# Patient Record
Sex: MALE | Race: BLACK OR AFRICAN AMERICAN | NOT HISPANIC OR LATINO | URBAN - METROPOLITAN AREA
[De-identification: names, ages, dates, MRNs, and addresses within clinical notes are randomized per-mention and may not be internally consistent; named-entity substitution may affect disease eponyms.]

---

## 2022-12-09 ENCOUNTER — OFFICE VISIT (OUTPATIENT)
Dept: URBAN - METROPOLITAN AREA CLINIC 26 | Facility: CLINIC | Age: 82
End: 2022-12-09
Payer: MEDICARE

## 2022-12-09 ENCOUNTER — LAB OUTSIDE AN ENCOUNTER (OUTPATIENT)
Dept: URBAN - METROPOLITAN AREA CLINIC 27 | Facility: CLINIC | Age: 82
End: 2022-12-09

## 2022-12-09 ENCOUNTER — OFFICE VISIT (OUTPATIENT)
Dept: URBAN - METROPOLITAN AREA CLINIC 27 | Facility: CLINIC | Age: 82
End: 2022-12-09
Payer: MEDICARE

## 2022-12-09 ENCOUNTER — WEB ENCOUNTER (OUTPATIENT)
Dept: URBAN - METROPOLITAN AREA CLINIC 27 | Facility: CLINIC | Age: 82
End: 2022-12-09

## 2022-12-09 VITALS
HEART RATE: 70 BPM | BODY MASS INDEX: 24.4 KG/M2 | WEIGHT: 151.8 LBS | SYSTOLIC BLOOD PRESSURE: 127 MMHG | DIASTOLIC BLOOD PRESSURE: 88 MMHG | HEIGHT: 66 IN

## 2022-12-09 DIAGNOSIS — Z79.82 LONG TERM (CURRENT) USE OF ASPIRIN: ICD-10-CM

## 2022-12-09 DIAGNOSIS — Z12.11 SCREEN FOR COLON CANCER: ICD-10-CM

## 2022-12-09 DIAGNOSIS — R10.13 EPIGASTRIC ABDOMINAL PAIN: ICD-10-CM

## 2022-12-09 DIAGNOSIS — I10 HYPERTENSION: ICD-10-CM

## 2022-12-09 PROCEDURE — 83014 H PYLORI DRUG ADMIN: CPT | Performed by: INTERNAL MEDICINE

## 2022-12-09 PROCEDURE — 99244 OFF/OP CNSLTJ NEW/EST MOD 40: CPT | Performed by: INTERNAL MEDICINE

## 2022-12-09 PROCEDURE — 83013 H PYLORI (C-13) BREATH: CPT | Performed by: INTERNAL MEDICINE

## 2022-12-09 PROCEDURE — 99204 OFFICE O/P NEW MOD 45 MIN: CPT | Performed by: INTERNAL MEDICINE

## 2022-12-09 NOTE — PHYSICAL EXAM NEUROLOGIC:
oriented to person, place and time, short and long term memory intact, sensory exam intact; mild/moderate cognitive impairment noted

## 2022-12-09 NOTE — HPI-TODAY'S VISIT:
OFFICE VISIT      Patient: Murphy Cleaning Date of Service: 2021   : 1957 MRN: [id number]     SUBJECTIVE:     Chief Complaint   Patient presents with   • Cough     onset: 2 days. Burning sensation in chest with coughing. Using vicks vaporub and mucinex. Vaccinated for COVID 19   • Sore Throat     hoarse voice       HISTORY OF PRESENT ILLNESS:  Murphy Cleaning is a 64 year old male who presents today for sore throat, hoarse voice, cough, and a burning sensation in chest with coughing x 2 days.     He's been using Vicks vaporub and Mucinex - which helps.     He denies of fever, runny nose, cp, sob, abdominal pain, n/v/d, loss sense of taste/smell, HA, or dizziness.     Denies of known exposure to Covid-19. Denies of recent traveling. He works as a , drives all over [facility].   He's vaccinated for Covid-19.     This whole visit is completed using  services [facility] #[id number]    No LMP for male patient.    PAST MEDICAL HISTORY:  Past Medical History:   Diagnosis Date   • Type 2 diabetes mellitus with hyperglycemia (CMS/[facility]) 9/15/2020       MEDICATIONS:  Current Outpatient Medications   Medication Sig   • amoxicillin (AMOXIL) 500 MG tablet Take 1 tablet by mouth 2 times daily for 10 days.   • Victoza 18 MG/3ML pen-injector ADMINISTER 1.2 MG UNDER THE SKIN DAILY   • metformin (GLUCOPHAGE) 1000 MG tablet Take 1 tablet by mouth 2 times daily.   • atorvastatin (LIPITOR) 20 MG tablet Take 1 tablet by mouth daily.   • insulin degludec (TRESIBA FLEXTOUCH) 100 UNIT/ML pen-injector Inject 30 Units into the skin daily. Prime 2 units before each dose.   • Insulin Pen Needle (Pen Needles 3/16\") 31G X 5 MM Misc 1 each 2 times daily.   • aspirin (ASPIRIN EC ADULT LOW STRENGTH) 81 MG EC tablet      No current facility-administered medications for this visit.       ALLERGIES:  ALLERGIES:  No Known Allergies    PAST SURGICAL HISTORY:  History reviewed. No pertinent surgical history.    FAMILY HISTORY:  Family  Patient here at the request of Alejandra Mancuso NP for evaluation of epigastric abdominal pain that has been that has been present >6mos.  He is a suboptimal historian due to a mild/moderate cognitive deficit, and his daughter is here to help with the history.  He apparently has had intermittent epigastric pain which is sometimes worse with p.o. intake.  It is deep and it does not radiate.  Pepcid is unhelpful.  It occurs on a near daily basis and will often last most of the day.  He does not take any medication for this aside from Tylenol.  He has no other GI symptoms.  He has not had any rectal bleeding or unintentional weight loss.  He has not had any nausea or reflux.  He has not had a prior upper endoscopy but may have undergone a colonoscopy >10y ago (?results).  He is s/p CABG in 2010.  He does take a daily aspirin.  CT abdomen pelvis in May of last year was without acute pathology. He has not had any recent labs or other imaging studies.  There is no family history of colon cancer or polyps. History   Problem Relation Age of Onset   • Cancer Mother    • Heart disease Father        SOCIAL HISTORY:  Social History     Tobacco Use   • Smoking status: Former Smoker     Packs/day: 0.00   • Smokeless tobacco: Never Used   Substance Use Topics   • Alcohol use: Not Currently   • Drug use: Never       Review of Systems   Constitutional: Negative.    HENT: Positive for sore throat and voice change.    Respiratory: Positive for cough. Negative for chest tightness, shortness of breath and wheezing.         Burning sensation to chest with coughing   Cardiovascular: Negative.    Gastrointestinal: Negative.    Skin: Negative.    Psychiatric/Behavioral: Negative.    All other systems reviewed and are negative.        OBJECTIVE:     Vitals:    11/24/21 1147   BP: (!) 148/96   BP Location: RUE - Right upper extremity   Patient Position: Sitting   Cuff Size: Large Adult   Pulse: 88   Resp: 20   Temp: 97 °F (36.1 °C)   TempSrc: Tympanic   SpO2: 96%       Physical Exam  Vitals and nursing note reviewed.   Constitutional:       General: He is not in acute distress.     Appearance: Normal appearance. He is not ill-appearing, toxic-appearing or diaphoretic.   HENT:      Head: Normocephalic and atraumatic.      Right Ear: Tympanic membrane, ear canal and external ear normal.      Left Ear: Tympanic membrane, ear canal and external ear normal.      Nose: Nose normal.      Right Sinus: No maxillary sinus tenderness or frontal sinus tenderness.      Left Sinus: No maxillary sinus tenderness or frontal sinus tenderness.      Mouth/Throat:      Lips: Pink.      Mouth: Mucous membranes are moist.      Pharynx: Posterior oropharyngeal erythema and uvula swelling present. No pharyngeal swelling or oropharyngeal exudate.      Tonsils: No tonsillar exudate or tonsillar abscesses. 1+ on the right. 1+ on the left.   Cardiovascular:      Rate and Rhythm: Normal rate and regular rhythm.      Heart sounds: Normal heart sounds.   Pulmonary:       Effort: Pulmonary effort is normal. No respiratory distress.      Breath sounds: Normal breath sounds. No stridor. No wheezing, rhonchi or rales.   Chest:      Chest wall: No tenderness.   Musculoskeletal:      Cervical back: Normal range of motion and neck supple. No rigidity or tenderness.   Lymphadenopathy:      Cervical: No cervical adenopathy.   Skin:     General: Skin is warm and dry.   Neurological:      General: No focal deficit present.      Mental Status: He is alert and oriented to person, place, and time. Mental status is at baseline.   Psychiatric:         Mood and Affect: Mood normal.         Behavior: Behavior normal.         Thought Content: Thought content normal.         Judgment: Judgment normal.         DIAGNOSTIC STUDIES:   LAB RESULTS:    Results for orders placed or performed in visit on 11/24/21   POCT RAPID STREP A   Result Value Ref Range    GRP A STREP Positive (A) Negative    Internal Procedural Controls Acceptable Yes              Assessment AND PLAN:     1. Strep pharyngitis    2. Sore throat    3. Cough    4. Elevated BP without diagnosis of hypertension      Orders Placed This Encounter   • 2019 Novel Coronavirus (SARS-CoV-2)   • POCT Rapid strep A   • COVID DIAGNOSTIC TESTING   • amoxicillin (AMOXIL) 500 MG tablet       -take medication as Rx'd  -eat yogurt or take probiotic to lessen GI upset  -drink at least 6-8oz glasses of water a day to stay hydrated    -avoid food that is greasy, crunchy, and spicy  -eat something soft and bland  -gargle salt water  -may take OTC throat lozenges, Chloraseptic spray, Tylenol, for pain  -switch out your tooth brush day #3 of your antibiotics  -do not share utensils; you are contagious until 24 hours after you started your antibiotics  -wash pillow cases in hot water  -follow up in office or with your primary doctor if no improvement or worsening of symptoms.      Elevated bp  Patient has no HTN diagnosis.   He's asymptomatic.   F/u with pcp for  elevated bp.       The patient indicated understanding of the diagnosis and agreed with the plan of care.        Gianna RINCON

## 2022-12-10 ENCOUNTER — LAB OUTSIDE AN ENCOUNTER (OUTPATIENT)
Dept: URBAN - METROPOLITAN AREA CLINIC 27 | Facility: CLINIC | Age: 82
End: 2022-12-10

## 2022-12-10 ENCOUNTER — TELEPHONE ENCOUNTER (OUTPATIENT)
Dept: URBAN - METROPOLITAN AREA CLINIC 27 | Facility: CLINIC | Age: 82
End: 2022-12-10

## 2022-12-10 PROBLEM — 38341003 HYPERTENSION: Status: ACTIVE | Noted: 2022-12-10

## 2022-12-10 LAB
A/G RATIO: 1.2
ABSOLUTE BASOPHILS: 12
ABSOLUTE EOSINOPHILS: 12
ABSOLUTE LYMPHOCYTES: 1915
ABSOLUTE MONOCYTES: 671
ABSOLUTE NEUTROPHILS: 3489
ALBUMIN: 3.5
ALKALINE PHOSPHATASE: 58
ALT (SGPT): 24
AMYLASE: 42
AST (SGOT): 39
BASOPHILS: 0.2
BILIRUBIN, TOTAL: 0.6
BUN/CREATININE RATIO: (no result)
BUN: 21
CALCIUM: 8.5
CARBON DIOXIDE, TOTAL: 23
CHLORIDE: 104
CREATININE: 1
EGFR: 76
EOSINOPHILS: 0.2
GLOBULIN, TOTAL: 2.9
GLUCOSE: 88
HEMATOCRIT: 43.2
HEMOGLOBIN: 14.1
LIPASE: 27
LYMPHOCYTES: 31.4
MCH: 29.5
MCHC: 32.6
MCV: 90.4
MONOCYTES: 11
MPV: 10.9
NEUTROPHILS: 57.2
PLATELET COUNT: 265
POTASSIUM: 3.9
PROTEIN, TOTAL: 6.4
RDW: 12.6
RED BLOOD CELL COUNT: 4.78
SODIUM: 140
TSH: 3.28
WHITE BLOOD CELL COUNT: 6.1

## 2022-12-10 RX ORDER — OMEPRAZOLE MAGNESIUM, AMOXICILLIN AND RIFABUTIN 10; 250; 12.5 MG/1; MG/1; MG/1
4 CAPSULES CAPSULE, DELAYED RELEASE ORAL
Qty: 168 | Refills: 0 | OUTPATIENT
Start: 2022-12-11 | End: 2022-12-25

## 2022-12-12 ENCOUNTER — TELEPHONE ENCOUNTER (OUTPATIENT)
Dept: URBAN - METROPOLITAN AREA CLINIC 27 | Facility: CLINIC | Age: 82
End: 2022-12-12

## 2022-12-12 RX ORDER — OMEPRAZOLE MAGNESIUM, AMOXICILLIN AND RIFABUTIN 10; 250; 12.5 MG/1; MG/1; MG/1
4 CAPSULES CAPSULE, DELAYED RELEASE ORAL
Qty: 168 | Refills: 0
Start: 2022-12-11 | End: 2022-12-26

## 2022-12-15 ENCOUNTER — WEB ENCOUNTER (OUTPATIENT)
Dept: URBAN - METROPOLITAN AREA CLINIC 27 | Facility: CLINIC | Age: 82
End: 2022-12-15

## 2022-12-15 ENCOUNTER — TELEPHONE ENCOUNTER (OUTPATIENT)
Dept: URBAN - METROPOLITAN AREA CLINIC 27 | Facility: CLINIC | Age: 82
End: 2022-12-15

## 2022-12-15 RX ORDER — AMOXICILLIN 500 MG/1
2 TABLET TABLET, FILM COATED ORAL TWICE A DAY
Qty: 56 TABLET | Refills: 0 | OUTPATIENT
Start: 2022-12-15 | End: 2022-12-29

## 2022-12-15 RX ORDER — OMEPRAZOLE MAGNESIUM, AMOXICILLIN AND RIFABUTIN 10; 250; 12.5 MG/1; MG/1; MG/1
4 CAPSULES CAPSULE, DELAYED RELEASE ORAL
Qty: 168 | Refills: 0 | Status: ACTIVE | COMMUNITY
Start: 2022-12-11 | End: 2022-12-26

## 2022-12-15 RX ORDER — CLARITHROMYCIN 500 MG/1
1 TABLET TABLET, FILM COATED ORAL
Qty: 28 TABLET | Refills: 0 | OUTPATIENT
Start: 2022-12-15 | End: 2022-12-29

## 2022-12-15 RX ORDER — LANSOPRAZOLE 30 MG/1
1 CAPSULE CAPSULE, DELAYED RELEASE ORAL TWICE A DAY
Qty: 28 CAPSULE | Refills: 0 | OUTPATIENT
Start: 2022-12-15

## 2022-12-20 ENCOUNTER — WEB ENCOUNTER (OUTPATIENT)
Dept: URBAN - METROPOLITAN AREA SURGERY CENTER 7 | Facility: SURGERY CENTER | Age: 82
End: 2022-12-20

## 2022-12-20 ENCOUNTER — CLAIMS CREATED FROM THE CLAIM WINDOW (OUTPATIENT)
Dept: URBAN - METROPOLITAN AREA SURGERY CENTER 7 | Facility: SURGERY CENTER | Age: 82
End: 2022-12-20
Payer: MEDICARE

## 2022-12-20 DIAGNOSIS — R10.13 ABDOMINAL DISCOMFORT, EPIGASTRIC: ICD-10-CM

## 2022-12-20 PROCEDURE — G8907 PT DOC NO EVENTS ON DISCHARG: HCPCS | Performed by: INTERNAL MEDICINE

## 2022-12-20 PROCEDURE — 43235 EGD DIAGNOSTIC BRUSH WASH: CPT | Performed by: INTERNAL MEDICINE

## 2022-12-20 RX ORDER — AMOXICILLIN 500 MG/1
2 TABLET TABLET, FILM COATED ORAL TWICE A DAY
Qty: 56 TABLET | Refills: 0 | Status: ACTIVE | COMMUNITY
Start: 2022-12-15 | End: 2022-12-29

## 2022-12-20 RX ORDER — OMEPRAZOLE MAGNESIUM, AMOXICILLIN AND RIFABUTIN 10; 250; 12.5 MG/1; MG/1; MG/1
4 CAPSULES CAPSULE, DELAYED RELEASE ORAL
Qty: 168 | Refills: 0 | Status: ACTIVE | COMMUNITY
Start: 2022-12-11 | End: 2022-12-26

## 2022-12-20 RX ORDER — LANSOPRAZOLE 30 MG/1
1 CAPSULE CAPSULE, DELAYED RELEASE ORAL TWICE A DAY
Qty: 28 CAPSULE | Refills: 0 | Status: ACTIVE | COMMUNITY
Start: 2022-12-15

## 2022-12-20 RX ORDER — CLARITHROMYCIN 500 MG/1
1 TABLET TABLET, FILM COATED ORAL
Qty: 28 TABLET | Refills: 0 | Status: ACTIVE | COMMUNITY
Start: 2022-12-15 | End: 2022-12-29

## 2022-12-21 ENCOUNTER — TELEPHONE ENCOUNTER (OUTPATIENT)
Dept: URBAN - METROPOLITAN AREA CLINIC 27 | Facility: CLINIC | Age: 82
End: 2022-12-21

## 2023-01-31 ENCOUNTER — OFFICE VISIT (OUTPATIENT)
Dept: URBAN - METROPOLITAN AREA CLINIC 27 | Facility: CLINIC | Age: 83
End: 2023-01-31
Payer: MEDICARE

## 2023-01-31 ENCOUNTER — OFFICE VISIT (OUTPATIENT)
Dept: URBAN - METROPOLITAN AREA CLINIC 26 | Facility: CLINIC | Age: 83
End: 2023-01-31
Payer: MEDICARE

## 2023-01-31 VITALS
BODY MASS INDEX: 22.98 KG/M2 | DIASTOLIC BLOOD PRESSURE: 80 MMHG | WEIGHT: 143 LBS | HEART RATE: 63 BPM | SYSTOLIC BLOOD PRESSURE: 143 MMHG | HEIGHT: 66 IN

## 2023-01-31 DIAGNOSIS — A04.8 H. PYLORI INFECTION: ICD-10-CM

## 2023-01-31 DIAGNOSIS — R10.13 EPIGASTRIC ABDOMINAL PAIN: ICD-10-CM

## 2023-01-31 DIAGNOSIS — Z12.11 SCREEN FOR COLON CANCER: ICD-10-CM

## 2023-01-31 DIAGNOSIS — B96.81 HELICOBACTER PYLORI (H. PYLORI) INFECTION: ICD-10-CM

## 2023-01-31 DIAGNOSIS — R41.89 COGNITIVE DEFICITS: ICD-10-CM

## 2023-01-31 PROCEDURE — 99213 OFFICE O/P EST LOW 20 MIN: CPT | Performed by: INTERNAL MEDICINE

## 2023-01-31 PROCEDURE — 83013 H PYLORI (C-13) BREATH: CPT | Performed by: INTERNAL MEDICINE

## 2023-01-31 PROCEDURE — 83014 H PYLORI DRUG ADMIN: CPT | Performed by: INTERNAL MEDICINE

## 2023-01-31 RX ORDER — LANSOPRAZOLE 30 MG/1
1 CAPSULE CAPSULE, DELAYED RELEASE ORAL TWICE A DAY
Qty: 28 CAPSULE | Refills: 0 | Status: ACTIVE | COMMUNITY
Start: 2022-12-15

## 2023-01-31 NOTE — HPI-TODAY'S VISIT:
Patient here for followup of epigastric abdominal pain that has been that has been present >6mos.  He is again noted to be a somewhat suboptimal historian due to a mild/moderate cognitive deficit, and his son is here to help with the history.  He was recently found to be H. pylori positive, and completed quadruple therapy a few weeks ago.  He is here in follow-up.  He states that he currently feels "much better".  His epigastric pain has resolved.  He does not have any reflux symptoms and his stools are regular.  He is eating well.  He underwent upper endoscopy 5 weeks ago at which time mild pangastritis was seen, especially in the antrum and prepyloric area, with a few well-healed ?erosions/ulcers.  He may have undergone a colonoscopy >10y ago (?results).  He is s/p CABG in 2010.  He does take a daily aspirin.  CT abdomen pelvis in May of last year was without acute pathology. There is no family history of colon cancer or polyps.  Labs last month including CBC, CMP lipase/amylase and TSH were all entirely normal.

## 2023-04-25 ENCOUNTER — OFFICE VISIT (OUTPATIENT)
Dept: URBAN - METROPOLITAN AREA CLINIC 27 | Facility: CLINIC | Age: 83
End: 2023-04-25
Payer: MEDICARE

## 2023-04-25 VITALS
RESPIRATION RATE: 17 BRPM | BODY MASS INDEX: 22.5 KG/M2 | DIASTOLIC BLOOD PRESSURE: 85 MMHG | WEIGHT: 140 LBS | SYSTOLIC BLOOD PRESSURE: 145 MMHG | HEART RATE: 54 BPM | HEIGHT: 66 IN

## 2023-04-25 DIAGNOSIS — R41.89 COGNITIVE DEFICITS: ICD-10-CM

## 2023-04-25 DIAGNOSIS — R19.15 BORBORYGMI: ICD-10-CM

## 2023-04-25 DIAGNOSIS — R10.84 ABDOMINAL DISCOMFORT, GENERALIZED: ICD-10-CM

## 2023-04-25 DIAGNOSIS — F41.9 ANXIETY: ICD-10-CM

## 2023-04-25 PROCEDURE — 99213 OFFICE O/P EST LOW 20 MIN: CPT | Performed by: INTERNAL MEDICINE

## 2023-04-25 RX ORDER — LANSOPRAZOLE 30 MG/1
1 CAPSULE CAPSULE, DELAYED RELEASE ORAL TWICE A DAY
Qty: 28 CAPSULE | Refills: 0 | Status: ACTIVE | COMMUNITY
Start: 2022-12-15

## 2023-04-25 NOTE — HPI-TODAY'S VISIT:
Patient here with complaints of abdominal discomfort and "rumbling" that have been present for the past month.  It is relatively constant.  He denies any exacerbating or alleviating factors.  He is not currently taking anything for this, though he does not know all of his medications and no one accompanies him today.  He has not had any reflux symptoms and denies abdominal pain.  His stools are regular and he is maintaining his weight.  He was found to be H. pylori positive last fall, and completed quadruple therapy a few weeks later.  H. pylori breath testing was negative in January.   He has had some anxiety lately, in dealing with his chronically ill wife.  He continues to have mild/moderate cognitive deficits as well.  He underwent upper endoscopy last fall at which time mild pangastritis was seen, especially in the antrum and prepyloric area, with a few well-healed ?erosions/ulcers.  He may have undergone a colonoscopy >10y ago (?results).  He is s/p CABG in 2010.  He does take a daily aspirin.  CT abdomen pelvis in May of last year was without acute pathology. There is no family history of colon cancer or polyps.  Labs from 12/2022 including CBC, CMP lipase/amylase and TSH were all entirely normal.

## 2023-04-25 NOTE — PHYSICAL EXAM GASTROINTESTINAL
Abdomen is soft, mild periumbilical abdominal TTP, nondistended, no guarding or rigidity, no masses palpable, normal bowel sounds; no tympany

## 2023-04-26 ENCOUNTER — TELEPHONE ENCOUNTER (OUTPATIENT)
Dept: URBAN - METROPOLITAN AREA CLINIC 35 | Facility: CLINIC | Age: 83
End: 2023-04-26

## 2023-04-26 LAB
H PYLORI BREATH TEST: DETECTED
H. PYLORI BREATH TEST: DETECTED
INTERPRETATION: DETECTED

## 2023-04-26 RX ORDER — BISMUTH SUBCITRATE POTASSIUM, METRONIDAZOLE AND TETRACYCLINE HYDROCHLORIDE 140; 125; 125 MG/1; MG/1; MG/1
1 CAPSULES AFTER MEALS AND AT BEDTIME CAPSULE ORAL
Qty: 40 CAPSULE | Refills: 0 | OUTPATIENT
Start: 2023-04-27 | End: 2023-05-07

## 2023-04-26 RX ORDER — OMEPRAZOLE 20 MG/1
1 CAPSULE CAPSULE, DELAYED RELEASE ORAL TWICE A DAY
Qty: 20 CAPSULE | Refills: 0 | OUTPATIENT
Start: 2023-04-27

## 2023-05-09 ENCOUNTER — OFFICE VISIT (OUTPATIENT)
Dept: URBAN - METROPOLITAN AREA CLINIC 27 | Facility: CLINIC | Age: 83
End: 2023-05-09

## 2023-06-29 ENCOUNTER — OFFICE VISIT (OUTPATIENT)
Dept: URBAN - METROPOLITAN AREA CLINIC 27 | Facility: CLINIC | Age: 83
End: 2023-06-29

## 2023-07-21 ENCOUNTER — OFFICE VISIT (OUTPATIENT)
Dept: URBAN - METROPOLITAN AREA CLINIC 27 | Facility: CLINIC | Age: 83
End: 2023-07-21
Payer: MEDICARE

## 2023-07-21 VITALS
BODY MASS INDEX: 22.5 KG/M2 | SYSTOLIC BLOOD PRESSURE: 145 MMHG | HEART RATE: 54 BPM | DIASTOLIC BLOOD PRESSURE: 85 MMHG | HEIGHT: 66 IN | WEIGHT: 140 LBS

## 2023-07-21 DIAGNOSIS — R10.84 ABDOMINAL DISCOMFORT, GENERALIZED: ICD-10-CM

## 2023-07-21 DIAGNOSIS — F41.9 ANXIETY: ICD-10-CM

## 2023-07-21 DIAGNOSIS — R19.15 BORBORYGMI: ICD-10-CM

## 2023-07-21 DIAGNOSIS — R44.0 AUDITORY HALLUCINATIONS: ICD-10-CM

## 2023-07-21 DIAGNOSIS — R41.89 COGNITIVE DEFICITS: ICD-10-CM

## 2023-07-21 PROCEDURE — 99213 OFFICE O/P EST LOW 20 MIN: CPT | Performed by: INTERNAL MEDICINE

## 2023-07-21 RX ORDER — OMEPRAZOLE 20 MG/1
1 CAPSULE CAPSULE, DELAYED RELEASE ORAL TWICE A DAY
Qty: 20 CAPSULE | Refills: 0 | Status: ACTIVE | COMMUNITY
Start: 2023-04-27

## 2023-07-21 RX ORDER — LANSOPRAZOLE 30 MG/1
1 CAPSULE CAPSULE, DELAYED RELEASE ORAL TWICE A DAY
Qty: 28 CAPSULE | Refills: 0 | Status: ACTIVE | COMMUNITY
Start: 2022-12-15

## 2023-07-21 NOTE — HPI-TODAY'S VISIT:
Patient here for followup of abdominal discomfort and "rumbling" >few months.  It is relatively constant.  He denies any exacerbating or alleviating factors.  He is not currently taking anything for this, though he does not know all of his medications.  He has not had any reflux symptoms and denies abdominal pain.  His stools are regular and he is maintaining his weight.  He was found to be H. pylori positive last fall, and completed quadruple therapy a few weeks later.  H. pylori breath testing was negative in January.  However, he was found to again be HP+ in April, and he was apparently given a prescription for Pylera; it is unclear whether he took it, though he states "I take all of the medications I'm prescribed".  He continues to have a fairly significant cognitive deficit at today's visit.  He admits to auditory hallucinations "I hear [employer] music in my head but nobody else can hear it".  He does not have any visual hallucinations.  He has not talked to his primary care doctor about this.  He continues to have anxiety, in dealing with his chronically ill wife.    He underwent upper endoscopy last fall at which time mild pangastritis was seen, especially in the antrum and prepyloric area, with a few well-healed ?erosions/ulcers.  He may have undergone a colonoscopy >10y ago (?results).  He is s/p CABG in 2010.  He does take a daily aspirin.  CT abdomen pelvis in May of last year was without acute pathology. There is no family history of colon cancer or polyps.  Labs from 12/2022 including CBC, CMP lipase/amylase and TSH were all entirely normal.

## 2023-07-25 ENCOUNTER — WEB ENCOUNTER (OUTPATIENT)
Dept: URBAN - METROPOLITAN AREA CLINIC 27 | Facility: CLINIC | Age: 83
End: 2023-07-25

## 2023-07-27 ENCOUNTER — TELEPHONE ENCOUNTER (OUTPATIENT)
Dept: URBAN - METROPOLITAN AREA CLINIC 27 | Facility: CLINIC | Age: 83
End: 2023-07-27

## 2023-07-27 LAB
H PYLORI BREATH TEST: DETECTED
H. PYLORI BREATH TEST: DETECTED
INTERPRETATION: DETECTED

## 2023-07-31 ENCOUNTER — TELEPHONE ENCOUNTER (OUTPATIENT)
Dept: URBAN - METROPOLITAN AREA CLINIC 27 | Facility: CLINIC | Age: 83
End: 2023-07-31

## 2023-07-31 RX ORDER — OMEPRAZOLE 20 MG/1
1 CAPSULE 30 MINUTES BEFORE MORNING MEAL CAPSULE, DELAYED RELEASE ORAL TWICE A DAY
Qty: 20 CAPSULE | Refills: 0 | OUTPATIENT
Start: 2023-08-01

## 2023-07-31 RX ORDER — TETRACYCLINE HYDROCHLORIDE 250 MG/1
1 CAPSULE ON AN EMPTY STOMACH CAPSULE ORAL
Qty: 20 CAPSULE | Refills: 0 | OUTPATIENT
Start: 2023-08-01 | End: 2023-08-11

## 2023-07-31 RX ORDER — METRONIDAZOLE 250 MG/1
1 TABLET TABLET ORAL TWICE A DAY
Qty: 20 TABLET | Refills: 0 | OUTPATIENT
Start: 2023-08-01 | End: 2023-08-11

## 2023-08-01 ENCOUNTER — WEB ENCOUNTER (OUTPATIENT)
Dept: URBAN - METROPOLITAN AREA CLINIC 27 | Facility: CLINIC | Age: 83
End: 2023-08-01

## 2023-08-03 ENCOUNTER — TELEPHONE ENCOUNTER (OUTPATIENT)
Dept: URBAN - METROPOLITAN AREA CLINIC 27 | Facility: CLINIC | Age: 83
End: 2023-08-03

## 2023-08-03 RX ORDER — DOXYCYCLINE HYCLATE 100 MG/1
1 CAPSULE CAPSULE, GELATIN COATED ORAL ONCE A DAY
Qty: 14 CAPSULE | Refills: 0 | OUTPATIENT
Start: 2023-08-03 | End: 2023-08-17

## 2023-08-29 ENCOUNTER — TELEPHONE ENCOUNTER (OUTPATIENT)
Dept: URBAN - METROPOLITAN AREA CLINIC 27 | Facility: CLINIC | Age: 83
End: 2023-08-29

## 2023-08-29 RX ORDER — OMEPRAZOLE 20 MG/1
1 CAPSULE 30 MINUTES BEFORE MORNING MEAL CAPSULE, DELAYED RELEASE ORAL TWICE A DAY
Qty: 20 CAPSULE | Refills: 0
Start: 2023-08-01

## 2023-08-30 ENCOUNTER — TELEPHONE ENCOUNTER (OUTPATIENT)
Dept: URBAN - METROPOLITAN AREA CLINIC 27 | Facility: CLINIC | Age: 83
End: 2023-08-30

## 2023-09-07 ENCOUNTER — LAB OUTSIDE AN ENCOUNTER (OUTPATIENT)
Dept: URBAN - METROPOLITAN AREA CLINIC 27 | Facility: CLINIC | Age: 83
End: 2023-09-07

## 2023-09-11 LAB — RESULT:: (no result)

## 2023-09-28 ENCOUNTER — OFFICE VISIT (OUTPATIENT)
Dept: URBAN - METROPOLITAN AREA CLINIC 27 | Facility: CLINIC | Age: 83
End: 2023-09-28
Payer: MEDICARE

## 2023-09-28 VITALS
HEART RATE: 56 BPM | BODY MASS INDEX: 22.5 KG/M2 | DIASTOLIC BLOOD PRESSURE: 80 MMHG | HEIGHT: 66 IN | WEIGHT: 140 LBS | SYSTOLIC BLOOD PRESSURE: 117 MMHG

## 2023-09-28 DIAGNOSIS — B96.81 HELICOBACTER PYLORI (H. PYLORI) INFECTION: ICD-10-CM

## 2023-09-28 DIAGNOSIS — F41.9 ANXIETY: ICD-10-CM

## 2023-09-28 DIAGNOSIS — R44.0 AUDITORY HALLUCINATIONS: ICD-10-CM

## 2023-09-28 DIAGNOSIS — R41.89 COGNITIVE DEFICITS: ICD-10-CM

## 2023-09-28 DIAGNOSIS — R10.84 ABDOMINAL DISCOMFORT, GENERALIZED: ICD-10-CM

## 2023-09-28 DIAGNOSIS — R19.15 BORBORYGMI: ICD-10-CM

## 2023-09-28 PROCEDURE — 99214 OFFICE O/P EST MOD 30 MIN: CPT | Performed by: INTERNAL MEDICINE

## 2023-09-28 RX ORDER — OMEPRAZOLE 20 MG/1
1 CAPSULE 30 MINUTES BEFORE MORNING MEAL CAPSULE, DELAYED RELEASE ORAL TWICE A DAY
Qty: 20 CAPSULE | Refills: 0 | Status: ACTIVE | COMMUNITY
Start: 2023-08-01

## 2023-09-28 RX ORDER — LANSOPRAZOLE 30 MG/1
1 CAPSULE CAPSULE, DELAYED RELEASE ORAL TWICE A DAY
Qty: 28 CAPSULE | Refills: 0 | Status: ON HOLD | COMMUNITY
Start: 2022-12-15

## 2023-09-28 RX ORDER — OMEPRAZOLE 20 MG/1
1 CAPSULE CAPSULE, DELAYED RELEASE ORAL TWICE A DAY
Qty: 20 CAPSULE | Refills: 0 | Status: ON HOLD | COMMUNITY
Start: 2023-04-27

## 2023-09-28 NOTE — HPI-TODAY'S VISIT:
Patient here for followup of abdominal discomfort and "rumbling" >few months.  He seems to be doing well at present.  He has not had any recent abdominal discomfort.  He does not have any reflux symptoms or nausea.  His stools are regular and he is maintaining his weight.  He continues to have "rumbling in my stomach".  He does not know what medications he is taking for this, though it was recommended to him at last visit that he take Align, IBGard, Prilosec and/or FDGard/Charcocaps.  His daughter who accompanies him today does not know his medications, either.  He continues to have intermittent mild/moderate cognitive deficits and intermittent auditory hallucinations.  He denies significant anxiety at present.  He was found to still be HP+ per HPBT a few months ago, and subsequently completed quadruple therapy for H. pylori last month; HP stool antigen a few weeks ago was negative.   He underwent upper endoscopy last fall at which time mild pangastritis was seen, especially in the antrum and prepyloric area, with a few well-healed ?erosions/ulcers.  He may have undergone a colonoscopy >10y ago (?results).  He is s/p CABG in 2010.  He does take a daily aspirin.  CT abdomen pelvis in May of last year was without acute pathology. There is no family history of colon cancer or polyps.  Labs from 12/2022 including CBC, CMP lipase/amylase and TSH were all entirely normal.

## 2023-09-28 NOTE — PHYSICAL EXAM GASTROINTESTINAL
Abdomen is soft, nontender, nondistended, no guarding or rigidity, no masses palpable, normal bowel sounds; no tympany

## 2024-01-31 ENCOUNTER — OFFICE VISIT (OUTPATIENT)
Dept: URBAN - METROPOLITAN AREA CLINIC 27 | Facility: CLINIC | Age: 84
End: 2024-01-31
Payer: MEDICARE

## 2024-01-31 ENCOUNTER — WEB ENCOUNTER (OUTPATIENT)
Dept: URBAN - METROPOLITAN AREA CLINIC 27 | Facility: CLINIC | Age: 84
End: 2024-01-31

## 2024-01-31 VITALS
WEIGHT: 165 LBS | SYSTOLIC BLOOD PRESSURE: 125 MMHG | DIASTOLIC BLOOD PRESSURE: 75 MMHG | HEIGHT: 66 IN | HEART RATE: 62 BPM | BODY MASS INDEX: 26.52 KG/M2

## 2024-01-31 DIAGNOSIS — R19.8 BORBORYGMI: ICD-10-CM

## 2024-01-31 PROBLEM — 413681009: Status: ACTIVE | Noted: 2024-01-31

## 2024-01-31 PROCEDURE — 99213 OFFICE O/P EST LOW 20 MIN: CPT | Performed by: PHYSICIAN ASSISTANT

## 2024-01-31 RX ORDER — OMEPRAZOLE 20 MG/1
1 CAPSULE CAPSULE, DELAYED RELEASE ORAL TWICE A DAY
Qty: 20 CAPSULE | Refills: 0 | Status: ON HOLD | COMMUNITY
Start: 2023-04-27

## 2024-01-31 RX ORDER — LANSOPRAZOLE 30 MG/1
1 CAPSULE CAPSULE, DELAYED RELEASE ORAL TWICE A DAY
Qty: 28 CAPSULE | Refills: 0 | Status: ON HOLD | COMMUNITY
Start: 2022-12-15

## 2024-01-31 RX ORDER — OMEPRAZOLE 20 MG/1
1 CAPSULE 30 MINUTES BEFORE MORNING MEAL CAPSULE, DELAYED RELEASE ORAL TWICE A DAY
Qty: 20 CAPSULE | Refills: 0 | Status: ACTIVE | COMMUNITY
Start: 2023-08-01

## 2024-01-31 NOTE — HPI-ZZZTODAY'S VISIT
Mr. Henao is an 83-year-old male patient of Dr. Morse here for follow up of [facility]. He was seen several mos ago with similar sx but does not remember that visit. He is with his son today. He feels gurgling within his abdomen on a regular basis. He has no associated N/V, reflux, abdominal pain, diarrhea, constipation. No melena, hematochezia, weight change. At last visit he was encouraged to start Align, IBGard, FDGard, OTC Prilosec, CharcoCaps. His son took a photo of his medications: he is taking the Align irregularly, has not tried the other meds but does take pantoprazole 40mg QD. He is taking 3 supplements containing magnesium (Mg tablets, MVI and Ca-Mg); he was not prescribed any of these but takes them because his wife takes them too. He did have some self limited diarrhea several wks ago.  He has intermittent mild to moderate cognitive deficits and intermittent auditory hallucinations.  He completed H. pylori treatment last year and subsequent stool antigen was negative.  He thinks his last colonoscopy was greater than 10 years ago.  He is s/p CABG in 2010, takes a daily aspirin. . EGD 2022:Mild gastritis, few well-healed erosions versus ulcers CT ab/pel 2022:No acute pathology . FH:No CRC or polyps

## 2024-03-05 ENCOUNTER — OV EP (OUTPATIENT)
Dept: URBAN - METROPOLITAN AREA CLINIC 27 | Facility: CLINIC | Age: 84
End: 2024-03-05

## 2024-04-24 ENCOUNTER — OV EP (OUTPATIENT)
Dept: URBAN - METROPOLITAN AREA CLINIC 27 | Facility: CLINIC | Age: 84
End: 2024-04-24
Payer: MEDICARE

## 2024-04-24 VITALS
BODY MASS INDEX: 26.84 KG/M2 | HEART RATE: 60 BPM | WEIGHT: 167 LBS | SYSTOLIC BLOOD PRESSURE: 127 MMHG | HEIGHT: 66 IN | DIASTOLIC BLOOD PRESSURE: 82 MMHG

## 2024-04-24 DIAGNOSIS — R10.84 ABDOMINAL DISCOMFORT, GENERALIZED: ICD-10-CM

## 2024-04-24 DIAGNOSIS — B96.81 HELICOBACTER PYLORI (H. PYLORI) INFECTION: ICD-10-CM

## 2024-04-24 DIAGNOSIS — R41.89 COGNITIVE DEFICITS: ICD-10-CM

## 2024-04-24 DIAGNOSIS — F41.9 ANXIETY: ICD-10-CM

## 2024-04-24 DIAGNOSIS — R19.15 BORBORYGMI: ICD-10-CM

## 2024-04-24 PROCEDURE — 99214 OFFICE O/P EST MOD 30 MIN: CPT | Performed by: INTERNAL MEDICINE

## 2024-04-24 RX ORDER — OMEPRAZOLE 20 MG/1
1 CAPSULE 30 MINUTES BEFORE MORNING MEAL CAPSULE, DELAYED RELEASE ORAL TWICE A DAY
Qty: 20 CAPSULE | Refills: 0 | Status: ACTIVE | COMMUNITY
Start: 2023-08-01

## 2024-04-24 RX ORDER — OMEPRAZOLE 20 MG/1
1 CAPSULE CAPSULE, DELAYED RELEASE ORAL TWICE A DAY
Qty: 20 CAPSULE | Refills: 0 | Status: ON HOLD | COMMUNITY
Start: 2023-04-27

## 2024-04-24 RX ORDER — LANSOPRAZOLE 30 MG/1
1 CAPSULE CAPSULE, DELAYED RELEASE ORAL TWICE A DAY
Qty: 28 CAPSULE | Refills: 0 | Status: ON HOLD | COMMUNITY
Start: 2022-12-15

## 2024-04-24 NOTE — HPI-TODAY'S VISIT:
Patient here for GI follow-up. He is accompanied by his son today. He continues to be a vague, suboptimal historian because of his dementia. He does not know his medications, but apparently is still taking pantoprazole 40 mg once daily. He denies abdominal pain but does have intermittent abdominal discomfort. A recent trial of IBGard and FDGard were apparently unhelpful. He is also taking an OTC supplement (colon clnz, ?reason). He does not have any diarrhea or constipation. He has no rectal bleeding and he has actually gained a few pounds since last visit. He has no reflux symptoms. H. pylori stool antigen was negative last year. He does not know if he is taking a probiotic, as was directed at last OV. He was found to still be HP+ per HPBT last year, and subsequently completed quadruple therapy for H. pylori; HP stool antigen was negative a few months later.   He underwent upper endoscopy in 2022 at which time mild pangastritis was seen, especially in the antrum and prepyloric area, with a few well-healed ?erosions/ulcers.  He may have undergone a colonoscopy >10y ago (?results).  He is s/p CABG in 2010.  He does take a daily aspirin.  CT abdomen pelvis in May 2022 was without acute pathology. There is no family history of colon cancer or polyps.  Labs at last check, including CBC, CMP lipase/amylase and TSH were all entirely normal.

## 2025-01-30 ENCOUNTER — WEB ENCOUNTER (OUTPATIENT)
Dept: URBAN - METROPOLITAN AREA CLINIC 27 | Facility: CLINIC | Age: 85
End: 2025-01-30

## 2025-01-30 ENCOUNTER — LAB OUTSIDE AN ENCOUNTER (OUTPATIENT)
Dept: URBAN - METROPOLITAN AREA CLINIC 27 | Facility: CLINIC | Age: 85
End: 2025-01-30